# Patient Record
Sex: FEMALE | Race: BLACK OR AFRICAN AMERICAN | NOT HISPANIC OR LATINO | URBAN - NONMETROPOLITAN AREA
[De-identification: names, ages, dates, MRNs, and addresses within clinical notes are randomized per-mention and may not be internally consistent; named-entity substitution may affect disease eponyms.]

---

## 2018-01-17 PROBLEM — H52.13: Noted: 2018-01-17

## 2018-01-17 PROBLEM — H52.223: Noted: 2018-01-17

## 2019-02-26 ENCOUNTER — IMPORTED ENCOUNTER (OUTPATIENT)
Dept: URBAN - NONMETROPOLITAN AREA CLINIC 1 | Facility: CLINIC | Age: 19
End: 2019-02-26

## 2019-02-26 ENCOUNTER — PREPPED CHART (OUTPATIENT)
Dept: RURAL CLINIC 1 | Facility: CLINIC | Age: 19
End: 2019-02-26

## 2019-02-26 PROCEDURE — 92014 COMPRE OPH EXAM EST PT 1/>: CPT

## 2019-02-26 PROCEDURE — 92015 DETERMINE REFRACTIVE STATE: CPT

## 2019-02-26 NOTE — PATIENT DISCUSSION
Myopia-Discussed diagnosis with patient. -Explained that people who are myopic are at a higher risk for developing RD/RT and reviewed associated S&S.-Pt to contact our office if symptoms develop. Astigmatism-Discussed diagnosis with patient. Updated spec Rx given. Recommend lens that will provide comfort as well as protect safety and health of eyes. order cl trials then rtc for fitting and ir

## 2021-08-31 ENCOUNTER — IMPORTED ENCOUNTER (OUTPATIENT)
Dept: URBAN - NONMETROPOLITAN AREA CLINIC 1 | Facility: CLINIC | Age: 21
End: 2021-08-31

## 2021-08-31 PROBLEM — H16.292: Noted: 2021-08-31

## 2021-08-31 PROCEDURE — 99203 OFFICE O/P NEW LOW 30 MIN: CPT

## 2021-08-31 NOTE — PATIENT DISCUSSION
Keratoconjunctivitis OS-  discussed findings w/patient-  mild irritation-  start Maxitrol QID OS x 7 days then d/c-  gave patient Rx to take to pharmacy-  no CL's while treating the eye-  PRN

## 2022-01-30 ASSESSMENT — VISUAL ACUITY
OS_SC: 20/200
OD_SC: 20/200

## 2022-01-30 ASSESSMENT — TONOMETRY
OS_IOP_MMHG: 14
OD_IOP_MMHG: 14

## 2022-04-10 ASSESSMENT — TONOMETRY
OD_IOP_MMHG: 13
OS_IOP_MMHG: 14
OD_IOP_MMHG: 14
OS_IOP_MMHG: 13

## 2022-04-10 ASSESSMENT — VISUAL ACUITY
OD_SC: J2
OS_CC: J2
OS_PH: 20/50
OS_SC: J2
OS_CC: 20/200
OD_CC: 20/200
OD_CC: 20/60
OD_PH: 20/50
OD_PH: 20/50
OD_CC: J2
OS_CC: 20/100
OS_PH: 20/40+1

## 2022-04-13 ENCOUNTER — ESTABLISHED PATIENT (OUTPATIENT)
Dept: RURAL CLINIC 1 | Facility: CLINIC | Age: 22
End: 2022-04-13

## 2022-04-13 DIAGNOSIS — H52.13: ICD-10-CM

## 2022-04-13 PROCEDURE — 92014 COMPRE OPH EXAM EST PT 1/>: CPT

## 2022-04-13 PROCEDURE — 92015 DETERMINE REFRACTIVE STATE: CPT

## 2022-04-13 ASSESSMENT — VISUAL ACUITY
OS_SC: 20/200
OU_SC: 20/20
OU_SC: 20/200+1
OS_SC: 20/20
OD_SC: 20/200
OD_SC: 20/20

## 2022-04-13 ASSESSMENT — TONOMETRY
OD_IOP_MMHG: 15
OS_IOP_MMHG: 15

## 2022-04-13 NOTE — PATIENT DISCUSSION
Keratoconjunctivitis OS-  discussed findings w/patient-  mild irritation-  start Maxitrol QID OS x 7 days then d/c-  gave patient Rx to take to pharmacy-  no CL's while treating the eye-  PRN.